# Patient Record
Sex: MALE | Race: WHITE | NOT HISPANIC OR LATINO | Employment: UNEMPLOYED | URBAN - METROPOLITAN AREA
[De-identification: names, ages, dates, MRNs, and addresses within clinical notes are randomized per-mention and may not be internally consistent; named-entity substitution may affect disease eponyms.]

---

## 2019-10-09 PROBLEM — I10 ESSENTIAL HYPERTENSION: Status: ACTIVE | Noted: 2019-10-09

## 2019-10-09 PROBLEM — R07.89 OTHER CHEST PAIN: Status: ACTIVE | Noted: 2019-10-09

## 2019-10-09 PROBLEM — Z72.0 TOBACCO ABUSE: Status: ACTIVE | Noted: 2019-10-09

## 2019-10-09 PROBLEM — F15.10 METHAMPHETAMINE ABUSE: Status: ACTIVE | Noted: 2019-10-09

## 2019-10-09 PROBLEM — F10.10 ALCOHOL ABUSE: Status: ACTIVE | Noted: 2019-10-09

## 2023-06-06 DIAGNOSIS — F17.200 NEEDS SMOKING CESSATION EDUCATION: ICD-10-CM

## 2024-02-01 ENCOUNTER — TELEPHONE (OUTPATIENT)
Dept: SURGERY | Facility: CLINIC | Age: 54
End: 2024-02-01
Payer: MEDICAID

## 2024-02-02 DIAGNOSIS — C44.311 BASAL CELL CARCINOMA OF SKIN OF NOSE: Primary | ICD-10-CM

## 2024-03-04 ENCOUNTER — TELEPHONE (OUTPATIENT)
Dept: SURGERY | Facility: CLINIC | Age: 54
End: 2024-03-04
Payer: MEDICAID

## 2024-03-06 ENCOUNTER — TELEPHONE (OUTPATIENT)
Dept: SURGERY | Facility: CLINIC | Age: 54
End: 2024-03-06
Payer: MEDICAID

## 2024-03-06 ENCOUNTER — OFFICE VISIT (OUTPATIENT)
Dept: OTOLARYNGOLOGY | Facility: CLINIC | Age: 54
End: 2024-03-06
Payer: MEDICAID

## 2024-03-06 VITALS
DIASTOLIC BLOOD PRESSURE: 84 MMHG | SYSTOLIC BLOOD PRESSURE: 129 MMHG | RESPIRATION RATE: 18 BRPM | BODY MASS INDEX: 25.2 KG/M2 | HEIGHT: 70 IN | TEMPERATURE: 98 F | WEIGHT: 176 LBS | HEART RATE: 66 BPM

## 2024-03-06 DIAGNOSIS — C44.311 BASAL CELL CARCINOMA OF SKIN OF NOSE: ICD-10-CM

## 2024-03-06 PROCEDURE — 99214 OFFICE O/P EST MOD 30 MIN: CPT | Mod: PBBFAC | Performed by: OTOLARYNGOLOGY

## 2024-03-06 RX ORDER — ATENOLOL 50 MG/1
50 TABLET ORAL
COMMUNITY
Start: 2024-01-09

## 2024-03-06 RX ORDER — NIFEDIPINE 30 MG/1
30 TABLET, EXTENDED RELEASE ORAL 2 TIMES DAILY
COMMUNITY
Start: 2024-01-09

## 2024-03-06 NOTE — PROGRESS NOTES
Patient Name: Julian Raines   YOB: 1970     Chief Complaint:   Basal cell carcinoma of the right side of the nose.     History of Present Illness:  3/6/2024:   53-year-old male who has a resident of [place], [place], referred for evaluation of a basal cell carcinoma involving the right side of his nose.  The patient had indicated that he has had a growth on the right side of his nose for a couple of years which has recently been enlarging.  The lesion will bleed periodically if he rubs it or manipulates it.  There is no associated pain or tenderness.  No nasal obstruction.  A punch biopsy of the lesion was done on 1/9/2024, at the same time he was undergoing repair of an umbilical hernia and left inguinal hernia.  The punch biopsy indicated the lesion to be a basal cell carcinoma.  Patient does not have a prior history of skin cancer.  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  No other history of significant sun exposure.  Patient had indicated that he does live in [place] and is currently working in [facility] for approximately 1 month.    Past Medical History:  Past Medical History:   Diagnosis Date    Hypertension      History reviewed. No pertinent surgical history.    Social History:  Social History     Socioeconomic History    Marital status: Single   Tobacco Use    Smoking status: Every Day     Types: Vaping with nicotine    Smokeless tobacco: Never   Substance and Sexual Activity    Alcohol use: Yes    Drug use: Yes     Types: Methamphetamines       Review of Systems:  Unremarkable except as mentioned above.    Current Medications:  Current Outpatient Medications   Medication Sig    atenoloL (TENORMIN) 50 MG tablet Take 50 mg by mouth.    NIFEdipine (PROCARDIA-XL) 30 MG (OSM) 24 hr tablet Take 30 mg by mouth 2 (two) times daily.    tamsulosin (FLOMAX) 0.4 mg Cap Take 1 capsule by mouth.    cyclobenzaprine (FLEXERIL) 10 MG tablet Take 10  "mg by mouth every evening.    naproxen (NAPROSYN) 500 MG tablet Take 500 mg by mouth 2 (two) times daily as needed.     No current facility-administered medications for this visit.        Allergies:  Review of patient's allergies indicates:  No Known Allergies     Family History:  History reviewed. No pertinent family history.    Physical Exam:  Vital signs:   Vitals:    03/06/24 0937 03/06/24 0942   BP: (!) 151/89 129/84   BP Location: Right arm Right arm   Patient Position: Sitting    BP Method: Medium (Automatic)    Pulse: 66    Resp: 18    Temp: 98.1 °F (36.7 °C)    Weight: 79.8 kg (176 lb)    Height: 5' 10" (1.778 m)    General:  Well-developed well-nourished male in no acute distress.  Voice is normal.  Head and face:  Patient does have fairly well-circumscribed nodular mildly erythematous lesion with pearly borders with healing wound consistent with the prior biopsy site involving the right nasal ala at the junction with the cheek; measures approximately 1 x 1.25 cm.  Ala rim is grossly uninvolved.  No other facial lesions.        Ears:  Right ear-auricle is normally developed.  External auditory canal is clear.  Tympanic membrane is nonerythematous.  No middle ear effusion.  Left ear-auricle is normally developed.  External auditory canal is clear.  Tympanic membrane is nonerythematous.  No middle ear effusion.  Nose:  Nasal dorsum is unremarkable.  No significant septal deviation.  No significant intranasal congestion.  Secretions are clear.  Oral cavity and oropharynx:  Tongue and floor mouth without lesions.  Patient does have multiple missing teeth and some dental caries.  2+ right tonsil hypertrophy with mild erythema.  1+ left tonsillar hypertrophy.  Both tonsils are soft to palpation.  No pharyngeal erythema or exudates.    Neck:  Supple without adenopathy or thyromegaly.  Trachea is in the midline.  Parotid and submandibular glands are normal to palpation without masses or tenderness.  Eyes:  " Extraocular muscles intact.  No nystagmus.  No exophthalmos or enophthalmos.  Neurologic:  Alert and oriented.  Cranial nerves 2-12 are grossly normal.    Assessment/Plan:  53-year-old male who has a resident of [facility] with basal cell carcinoma involving the right nasal ala.      Plan:  Discussed surgical excision reconstruction of the lesion with the patient as the treatment of choice.  Given that the patient does live in [place] recommended that we refer him to the ENT Clinic at [facility].  Patient is agreeable.  We will contact the patient once I am able to speak with physicians there.      Shemar Samuels M.D.      Addendum:   I would spoken with the  at the ENT Clinic at [facility] in [place].  They will fax a form for me to fill out and then contact the patient with an appointment.  The patient's MRN was given to the clinic there.  I informed the patient to expect a phone call from them to schedule an appointment.  If he does not hear from them within 1 week he is to let me know.    Shemar Samuels M.D.